# Patient Record
(demographics unavailable — no encounter records)

---

## 2025-02-05 NOTE — PHYSICAL EXAM
[Chaperone Present] : A chaperone was present in the examining room during all aspects of the physical examination [[id number]] : A chaperone was present during the pelvic exam. [Appropriately responsive] : appropriately responsive [Alert] : alert [No Acute Distress] : no acute distress [Oriented x3] : oriented x3 [Labia Majora] : normal [Labia Majora Erythema] : erythema [Labia Minora] : normal [Labia Minora Erythema] : erythema [Discharge] : a  ~M vaginal discharge was present [No Bleeding] : There was no active vaginal bleeding [Normal] : normal [Uterine Adnexae] : normal

## 2025-02-05 NOTE — PHYSICAL EXAM
[Chaperone Present] : A chaperone was present in the examining room during all aspects of the physical examination [[id number]] : A chaperone was present during the pelvic exam. [Appropriately responsive] : appropriately responsive [Alert] : alert [No Acute Distress] : no acute distress [Oriented x3] : oriented x3 [Labia Majora] : normal [Labia Majora Erythema] : erythema [Labia Minora Erythema] : erythema [Labia Minora] : normal [Discharge] : a  ~M vaginal discharge was present [No Bleeding] : There was no active vaginal bleeding [Normal] : normal [Uterine Adnexae] : normal

## 2025-02-05 NOTE — PLAN
[FreeTextEntry1] : UCX sent out to r/o a UTI.   Erythematous externally consistent with yeast as well as vaginal discharge. Prescriptions for Lorisone & Terconazole given. Aptima culture collected. If BV is on culture, will send metrogel.   F/u annually or as needed.

## 2025-02-05 NOTE — HISTORY OF PRESENT ILLNESS
[Oral Contraceptive] : uses oral contraception pills [Y] : Positive pregnancy history [Menarche Age: ____] : age at menarche was [unfilled] [No] : Patient does not have concerns regarding sex [Currently Active] : currently active [Men] : men [LMP unknown] : LMP unknown [unknown] : Patient is unsure of the date of her LMP [BreastSonogramDate] : 07/23/21 [TextBox_25] : BR1 [PapSmeardate] : 03/25/24 [TextBox_31] : NEG [GonorrheaDate] : 03/25/24 [TextBox_63] : NEG [ChlamydiaDate] : 03/25/24 [TextBox_68] : NEG [PGxTotal] : 1 [[facility]] : 1 [[facility]] : 1 [FreeTextEntry1] : C/S-1

## 2025-02-05 NOTE — HISTORY OF PRESENT ILLNESS
[Oral Contraceptive] : uses oral contraception pills [Y] : Positive pregnancy history [Menarche Age: ____] : age at menarche was [unfilled] [No] : Patient does not have concerns regarding sex [Currently Active] : currently active [Men] : men [LMP unknown] : LMP unknown [unknown] : Patient is unsure of the date of her LMP [BreastSonogramDate] : 07/23/21 [TextBox_25] : BR1 [PapSmeardate] : 03/25/24 [TextBox_31] : NEG [GonorrheaDate] : 03/25/24 [TextBox_63] : NEG [ChlamydiaDate] : 03/25/24 [TextBox_68] : NEG [PGxTotal] : 1 [[facility]xF[facility]] : 1 [[facility]] : 1 [FreeTextEntry1] : C/S-1

## 2025-02-05 NOTE — END OF VISIT
[FreeTextEntry3] : I, John Choudhury solely acted as scribe for Dr. Germania Gonzalez on 02/05/2025  All medical entries made by the Scribe were at my, Dr. Gill, direction and personally dictated by me on 02/05/2025 . I have reviewed the chart and agree that the record accurately reflects my personal performance of the history, physical exam, assessment and plan. I have also personally directed, reviewed, and agreed with the chart.

## 2025-03-27 NOTE — PHYSICAL EXAM
[Chaperone Present] : A chaperone was present in the examining room during all aspects of the physical examination [FreeTextEntry2] : Earl  [Appropriately responsive] : appropriately responsive [Alert] : alert [No Acute Distress] : no acute distress [Soft] : soft [Non-tender] : non-tender [Non-distended] : non-distended [No HSM] : No HSM [No Lesions] : no lesions [No Mass] : no mass [Oriented x3] : oriented x3 [Examination Of The Breasts] : a normal appearance [No Masses] : no breast masses were palpable [Labia Majora] : normal [Labia Minora] : normal [Normal] : normal [Uterine Adnexae] : normal

## 2025-03-27 NOTE — HISTORY OF PRESENT ILLNESS
[N] : Patient reports normal menses [Oral Contraceptive] : uses oral contraception pills [Y] : Positive pregnancy history [Menarche Age: ____] : age at menarche was [unfilled] [Currently Active] : currently active [Men] : men [LMP unknown] : LMP unknown [unknown] : Patient is unsure of the date of her LMP [PapSmeardate] : 03/25/24 [TextBox_31] : NEG [HIVDate] : 09/29/21 [TextBox_53] : NEG [SyphilisDate] : 09/29/21 [TextBox_58] : NEG [GonorrheaDate] : 03/25/24 [ChlamydiaDate] : 03/25/24 [TextBox_63] : NEG [TextBox_68] : NEG [TrichomonasDate] : 02/05/25 [TextBox_73] : NEG [HPVDate] : 05/08/23 [TextBox_78] : POS, HPV16&18/45-NEG [PGxTotal] : 1 [[facility]] : 1 [[facility]] : 1 [FreeTextEntry1] : : 1

## 2025-03-27 NOTE — PLAN
[FreeTextEntry1] : urine C&S to r/o infection Pap smear RTC in 1 yr or as needed Self-breast exams advised and discussed with pt Role of diet and exercise advised in maintaining healthy weight and BMI  Foods rich in calcium and vitamin D as well as weight bearing exercised for bone health discussed with pt Following Primary care for health maintenance advised

## 2025-04-26 NOTE — PHYSICAL EXAM
[Appropriately responsive] : appropriately responsive [Alert] : alert [No Acute Distress] : no acute distress [Oriented x3] : oriented x3 [FreeTextEntry7] : no CVA tenderness [Labia Majora] : normal [Labia Minora] : normal [Normal] : normal [Uterine Adnexae] : normal

## 2025-04-26 NOTE — HISTORY OF PRESENT ILLNESS
[LMP unknown] : LMP unknown [N] : Patient reports normal menses [Oral Contraceptive] : uses oral contraception pills [Y] : Positive pregnancy history [unknown] : Patient is unsure of the date of her LMP [Menarche Age: ____] : age at menarche was [unfilled] [Currently Active] : currently active [Men] : men [No] : No [TextBox_4] : Norah is here for c/o urinary frequency, post void discomfort.  She was seen a few weeks ago for her annual with similar complaint.  She had a UA completed, but culture not sent.  She was sent bactrim to treat UTI when she notified provider her symptoms were persisting.  She did feel better while taking the abx, but the symptoms returned a few days after finishing. She is waking frequently during the night which is bothering her the most.  she denies vaginal itching, discharge, odor.  recent gc.ct were negative. no new partners. [BreastSonogramDate] : 07/23/2021 [TextBox_25] : complete RT breast 1 [PapSmeardate] : 03/27/2025 [TextBox_31] : neg  [ColonoscopyDate] : n/a [HIVDate] : 09/29/2021 [TextBox_53] : neg  [SyphilisDate] : 09/29/2021 [TextBox_58] : neg  [GonorrheaDate] : 03/25/2024 [TextBox_63] : neg  [ChlamydiaDate] : 03/25/2024 [TextBox_68] : neg  [TrichomonasDate] : 02/05/2025 [TextBox_73] : neg [HPVDate] : 03/27/2025 [TextBox_78] : neg  hx of pos hpv 05/08/2023 [PGxTotal] : 1 [[facility]xF[facility]] : 1 [[facility]] : 1 [FreeTextEntry1] : c/s: 1 (2021) Gardasil: yes d&c: no Exercise: occasionally  [FreeTextEntry2] : in a relationship

## 2025-04-26 NOTE — PLAN
[FreeTextEntry1] : Treatment for UTI initiated culture obtained- if culture is negative and symptoms persist, will need to refer to urology vaginal culture obtained.  pt states she had recent DM screening completed - no DM.

## 2025-07-18 NOTE — HISTORY OF PRESENT ILLNESS
[Improved Health] : Improved health [Quality of Life] : Quality of life [Improved Mobility] : Improved mobility [Improve Mood] : Improved mood [FreeTextEntry2] : 200 [FreeTextEntry3] : 280 [FreeTextEntry1] : EVER MENDOZA is a 29 year year old who comes in for a dietary/weight loss consultation. Past medical history is significant for obesity. Patient's vital signs were reviewed. Her reported height is 5'3. Today's weight is 280. BMI is 49.60. A detailed weight history was obtained. THis is the heaviest the patient has been.  The patient has tried numerous weight loss programs including self-directed and physician supervised diets and self-directed exercise programs. She has lost greater than 10 pounds on more than one occasion, however, has experienced weight regain despite her best efforts with healthy diet and exercise.     LABS - DATE: HgA1C:    DIETING HISTORY Prior Diets: cutting calories, History of Bariatric Surgery:  No If yes, what Bariatric Surgery: Interest in Bariatric surgery: Maybe   History of Weight Loss Medications: None If yes, what Medications: Complications & Side Effects of Anti-Obesity Medications:     LIFESTYLE: Contraception: OCPs Sleep: 8 hours Alcohol: occasionally Exercise : walks 3x/week Occupation: medical assistant   NUTRITION: Breakfast 8am eggs, bagel  Lunch: 12 noon chicken, steak, rice, occasional salad Snack: yogurt, chips,  Dinner: leftover lunch, or misses dinner 3x/week Snacks:  Drinks:water,  juice 1/2x/ week,  water  I have instructed the patient to follow a 1200 calories meal plan emphasizing low saturated fat sources with moderate amount of sodium as well. Information on fast food eating was supplied and additional information on low fat eating was also supplied. Suggestions of meals and snacks were discussed with the patient in great detail. We reviewed the efforts of weight reduction identifying 3500 calories in pound of body fat and the need to gradually and sloqly chip away at this number on a long term basis for weight reduction. It is a lifestyle change. WE discussed the need to reduce calories for what her current patterns are and to hopefully increase physical activity as well.   Patient admits to eating quickly, skipping meals, and poor food choices. Discussed the importance of a balanced, healthy diet of nourishing foods and consistent meal pattern for long-term wt loss success and health maintenance. Pt educated on eating 4-6 small meals per day, that are high in protein for hunger regulation. Pt educated on cutting out high-sugar content foods sabotaging wt loss. Pt verbalized understanding and states She will cut out high-sugar content foods and increase water intake to facilitate adequate hydration.   Her physical activity is minimal at this time. Recommendations given to the patient to increase the intensity and the duration of her physical activity with the goal of 30mins five times a week. Recommend the patient to reduce calories by 500 daily to support a weight loss of 1 pound a week. This translated into a 1200 calorie plan. I encouraged the patient to keep food records in order to better track calorie consumed. I recommended low fat selections and especially those that are lower in saturated fats. Emphasis would be placed on monitoring portions of meat and having more moderate snacks between meal as well.      Will try Course of : Patient with BMI of 49.60. We discussed the importance of weight loss. Discussed the need to incorporate 30 g of protein with each meal. Recommended meeting with the RD to discuss dietary changes she can work on. At least 15 minutes was spent during the visit on obesity counseling.   Lab had been drawn by PMD. Patient to send labs for review.    Metformin Rx sent. Risks include but are not limited to nausea, vomiting, constipation, diarrhea, and Gi upset.  Pt. verbalize understanding and wishes to proceed with medical therapy. Instructions for use provided via office demonstration.

## 2025-07-18 NOTE — REASON FOR VISIT
[Home] : at home, [unfilled] , at the time of the visit. [Medical Office: ([facility])___] : at the medical office located in  [Telehealth (audio & video)] : This visit was provided via telehealth using real-time 2-way audio visual technology. [Verbal consent obtained from patient] : the patient, [unfilled] [Initial Evaluation] : an initial evaluation